# Patient Record
Sex: FEMALE | Race: BLACK OR AFRICAN AMERICAN | URBAN - METROPOLITAN AREA
[De-identification: names, ages, dates, MRNs, and addresses within clinical notes are randomized per-mention and may not be internally consistent; named-entity substitution may affect disease eponyms.]

---

## 2021-11-29 ENCOUNTER — APPOINTMENT (OUTPATIENT)
Dept: GENERAL RADIOLOGY | Age: 36
End: 2021-11-29

## 2021-11-29 ENCOUNTER — HOSPITAL ENCOUNTER (EMERGENCY)
Age: 36
Discharge: LEFT BEFORE DISCHARGE | End: 2021-11-29

## 2021-11-29 ENCOUNTER — HOSPITAL ENCOUNTER (EMERGENCY)
Age: 36
Discharge: LEFT BEFORE DISCHARGE | End: 2021-11-30

## 2021-11-29 VITALS
WEIGHT: 195 LBS | HEIGHT: 64 IN | SYSTOLIC BLOOD PRESSURE: 213 MMHG | BODY MASS INDEX: 33.29 KG/M2 | RESPIRATION RATE: 14 BRPM | HEART RATE: 105 BPM | DIASTOLIC BLOOD PRESSURE: 139 MMHG | OXYGEN SATURATION: 95 % | TEMPERATURE: 97.5 F

## 2021-11-29 VITALS
TEMPERATURE: 98.4 F | RESPIRATION RATE: 16 BRPM | HEART RATE: 112 BPM | DIASTOLIC BLOOD PRESSURE: 109 MMHG | SYSTOLIC BLOOD PRESSURE: 198 MMHG | OXYGEN SATURATION: 96 %

## 2021-11-29 DIAGNOSIS — Z53.21 ELOPED FROM EMERGENCY DEPARTMENT: Primary | ICD-10-CM

## 2021-11-29 PROCEDURE — 4500000002 HC ER NO CHARGE

## 2021-11-29 ASSESSMENT — PAIN DESCRIPTION - LOCATION: LOCATION: HEAD;EYE

## 2021-11-29 ASSESSMENT — PAIN DESCRIPTION - FREQUENCY: FREQUENCY: CONTINUOUS

## 2021-11-29 ASSESSMENT — PAIN DESCRIPTION - PAIN TYPE: TYPE: ACUTE PAIN

## 2021-11-29 ASSESSMENT — PAIN DESCRIPTION - DESCRIPTORS: DESCRIPTORS: ACHING;CONSTANT;PRESSURE

## 2021-11-29 ASSESSMENT — PAIN DESCRIPTION - ONSET: ONSET: GRADUAL

## 2021-11-29 ASSESSMENT — PAIN DESCRIPTION - PROGRESSION: CLINICAL_PROGRESSION: GRADUALLY WORSENING

## 2021-11-29 ASSESSMENT — PAIN SCALES - GENERAL: PAINLEVEL_OUTOF10: 6

## 2021-11-29 NOTE — ED NOTES
Assessment:     Catherine was seen today for follow-up and letter for school/work.    Diagnoses and all orders for this visit:    ILD (interstitial lung disease)    Hypothyroid  -     Cancel: Thyroid Stimulating Hormone (TSH)  -     Thyroid Stimulating Hormone (TSH)    SLE (systemic lupus erythematosus related syndrome)  -     INR  -     HM1(CBC and Differential)  -     Comprehensive Metabolic Panel  -     predniSONE (DELTASONE) 1 MG tablet; Take 5 tablets (5 mg total) by mouth daily.  -     HM1 (CBC with Diff)    Migraine headache with aura    Frozen shoulder syndrome    Healthcare maintenance  -     Factor 10 Assay, Chromogenic        Plan:     1. ILD (interstitial lung disease)  Cc Dr. Bearden pulmonary.  I keeps up her nebs and her oxygen.  Because of her interstitial lung disease and her O2 dependence I will excuse her from jury duty note given    2. Hypothyroid  Has been stable with hypothyroidism on last TSH we'll recheck today.  I note that she does have a 10 pound weight loss from her last visit in October.  - Thyroid Stimulating Hormone (TSH)    3. SLE (systemic lupus erythematosus related syndrome)  Has SLE and sees Dr. José.  Is slowly weaning down her prednisone and is now on 5 mg daily.  I will check labs today  - INR  - HM1(CBC and Differential)  - Comprehensive Metabolic Panel  - predniSONE (DELTASONE) 1 MG tablet; Take 5 tablets (5 mg total) by mouth daily.  Dispense: 540 tablet; Refill: 0  - HM1 (CBC with Diff)    4. Migraine headache with aura  Only one migraine since her last office visit.  She opts to keep the neck splint on in place for now    5. Frozen shoulder syndrome  Range of motion regarding the upper extremities and light weights off for frozen shoulder syndrome.      This is a 40 minute visit with greater than 50% of the time spent counseling regarding weight loss issues reviewed.  Vertebral compression fractures reviewed.  We'll consider orthopedic consult for back bracing.  Does do  FIRST PROVIDER CONTACT ASSESSMENT NOTE       Department of Emergency Medicine   11/29/21  6:47 PM EST    Chief Complaint: Hypertension (pressure behind the eyes, does not take meds for it ) and Blurred Vision    History of Present Illness:   Waldemar Swift is a 38 y.o. female who presents to the ED for blurred vision and pressure behind eyes. Patient also reports hypertension. She does not take any medication for this. She is denying chest pain  [place]. Focused Physical Exam:  VS:  BP (!) 198/109   Pulse 112   Temp 98.4 °F (36.9 °C)   Resp 16   LMP 11/22/2021   SpO2 96%      General: Alert and in no apparent distress   CVS: Regular rate for age, normal rhythm  Resp: Clear and equal bilaterally with good airflow, no respiratory distress    Medical History:  has no past medical history on file. Surgical History:  has no past surgical history on file. Social History:  reports that she has been smoking cigarettes. She has been smoking about 0.50 packs per day. She has never used smokeless tobacco. She reports that she does not use drugs. Family History: family history is not on file. *ALLERGIES*     Patient has no known allergies.      Initial Plan of Care:  Initiate Treatment-Testing, Proceed toTreatment Area When Bed Available for ED Attending/MLP to Continue Care    -----------------END OF FIRST PROVIDER CONTACT ASSESSMENT NOTE--------------  Electronically signed by Connie Donovan PA-C   DD: 11/29/21         Connie Donovan PA-C  11/29/21 1848 light weights at home and is trying to be more active at home just walking around and doing more in her house which is excellent.  Weight loss perfect.  Encouraged her home exercises for frozen shoulder and she works with her  on this.  At this point in time she is not showing signs of depression we talk about transfer of care to one of my partners after I retire      Subjective:      Duane is a 38 y.o. female presenting to my clinic for follow-up of SLE, compression fractures, steroid dependence, interstitial lung disease with oxygen dependence.    Patient has not been in the ER since July when she had pneumonia.  This is been an excellent year for her.  She goes to the chronic pain clinic and they managed her oxycodone.  She sees Dr. markie rain and he helps manage her lupus.  [facility] and he helps manage her interstitial lung disease and her oxygen needs.  Currently she doesn't have any new problems with her back but at her kyphosis is progressing especially upper back kyphosis.    She has frozen shoulder and has gone to physical therapy.  She now works with her  to try to stretch out her arms and her range of motion about her arms.  Upon testing I see she has increased range of motion today.    Patient had depression in the past but currently does not show signs of depression.    She is on Coumadin for management of hypercoagulable state.  She has some lupus anticoagulant and we manage with the factor X.      She doesn't have any problems with her stomach or her GI system today.  She hasn't taken any falls.    She needs to be excused from jury duty.  He'll be too much for her to manage with follow her oxygen and her needs and her disability and poor mobility.      Current Outpatient Prescriptions on File Prior to Visit   Medication Sig Dispense Refill     alum/mag hydrox-simethicone-diphenhydramine-lidocaine (MAGIC MOUTHWASH) suspension Swish and swallow 5  "mL 4 (four) times a day as needed. Swish and swallow 5mL 3-4 times daily as needed. Standard prep: Diphenhydramine+Sucralfate+Mylanta       BD INSULIN PEN NEEDLE UF MINI 31 gauge x 3/16\" Ndle        biotin 2,500 mcg cap Take 2,500 mcg by mouth every evening.        calcium, as carbonate, (TUMS) 200 mg calcium (500 mg) chewable tablet Chew 1 tablet as needed.        cholecalciferol, vitamin D3, 1,000 unit tablet Take 1,000 Units by mouth daily.       FERROUS SULFATE (SLOW FE ORAL) Take 65 mg by mouth daily.       furosemide (LASIX) 40 MG tablet Take 1 tablet (40 mg total) by mouth 2 (two) times a day at 9am and 6pm. 60 tablet 11     hydroxychloroquine (PLAQUENIL) 200 mg tablet Take 1 tablet (200 mg total) by mouth 2 (two) times a day. 180 tablet 0     ipratropium-albuterol (DUO-NEB) 0.5-2.5 mg/3 mL nebulizer Take 3 mL by nebulization 4 (four) times a day. 40 vial 0     levothyroxine (SYNTHROID) 125 MCG tablet Take 1 tablet (125 mcg total) by mouth daily. 30 tablet 11     levothyroxine (SYNTHROID, LEVOTHROID) 125 MCG tablet TAKE 1 TABLET(125 MCG) BY MOUTH DAILY 90 tablet 2     LORazepam (ATIVAN) 1 MG tablet Take 1 tablet (1 mg total) by mouth once daily. 30 tablet 1     nystatin (MYCOSTATIN) powder Apply to affected area 3 times daily 60 g 1     oxyCODONE (ROXICODONE) 30 MG immediate release tablet Take 1 tablet (30 mg total) by mouth every 4 (four) hours as needed for pain. 180 tablet 0     OXYCONTIN 40 mg 12 hr tablet Take 1 tablet (40 mg total) by mouth every morning. 30 tablet 0     warfarin (COUMADIN) 2 MG tablet Take 4 mg by mouth See Admin Instructions. 4mg (2 x 2mg tabs=4mg) on Mon, Wed, Fri, Sat       warfarin (COUMADIN) 2 MG tablet yuridia 2 tabs daily,. Adjust dose based on INR results as directed. 180 tablet 0     [DISCONTINUED] predniSONE (DELTASONE) 1 MG tablet Take 6 tablets (6 mg total) by mouth daily. 540 tablet 0     albuterol (PROVENTIL HFA;VENTOLIN HFA) 90 mcg/actuation inhaler Inhale 2 puffs every 6 " (six) hours as needed for wheezing. 1 Inhaler 6     albuterol (PROVENTIL) 2.5 mg /3 mL (0.083 %) nebulizer solution Take 3 mL (2.5 mg total) by nebulization 4 (four) times a day as needed for wheezing or shortness of breath. 75 mL 11     warfarin (COUMADIN) 2 MG tablet 5 mg See Admin Instructions. 5 mg Tues/Thursday/Sunday       [DISCONTINUED] predniSONE (DELTASONE) 10 MG tablet Take 1 tablet (10 mg total) by mouth 2 (two) times a day for 5 days. 10 tablet 0     No current facility-administered medications on file prior to visit.      Allergies   Allergen Reactions     Hydrocodone-Acetaminophen Swelling     Throat swelling (tolerates oxycodone)  - Acetaminophen causes vomiting per patient/RN, 12/2014 tolerated hydromorphone with hospitalization     Protonix [Pantoprazole] Hives     Per pt  Per pt     Codeine Nausea Only     abd pain       Fentanyl Nausea And Vomiting and Unknown       Tolerated drip 9/28/15- pt states just nausea at times      Morphine Hives, Nausea And Vomiting and Other (See Comments)     Pt states nausea     Morphine Hcl Itching and Nausea And Vomiting     Pt states she has tolerated in past- just nausea and vomiting at times     Past Medical History   Diagnosis Date     Antiphospholipid antibody with hypercoagulable state      Secondary to lupus     Cervical compression fracture 2009     closed, with spinal cord injury C1-C4 - prednisone induced     Chronic hypercapnic respiratory failure 12/22/2014     Chronic pain      on opiates     Congestive heart failure      Diastolic, Cor Pulmonale, EF 60% 2014     Depression      Gastroparesis      causing nausea/vomiting with recurrent admissions     GERD (gastroesophageal reflux disease)      Hypertension      Lupus (systemic lupus erythematosus)      complicated by joint and pulmonary involvement     Obesity      MERLIN (obstructive sleep apnea) 1/30/2015     On home bipap     Osteoporosis      Peripheral neuropathy      Recurrent Clostridium difficile  diarrhea      Seizures      once due to high BP, hypertensive encephalopathy     Past Surgical History   Procedure Laterality Date     Pr lap,cholecystectomy       Description: Cholecystectomy Laparoscopic;  Recorded: 02/15/2008;     Pr biopsy lung/mediastinum percutaneous needle       Description: Biopsy Lung Percutaneous;  Recorded: 09/15/2011;     Thoracoscopy Left 1/13/2015     Procedure: LEFT THORACOSCOPY CONVERTED TO THORACOTOMY;  Surgeon: Jose David Granados MD;  Location: [facility];  Service:      Thoracotomy Left 1/13/2015     Procedure: THORACOTOMY W/ WEDGE RESECTION;  Surgeon: Jose David Granados MD;  Location: [facility];  Service:      Picc and midline team line insertion  9/27/2015           Social History     Social History     Marital status:      Spouse name: N/A     Number of children: N/A     Years of education: N/A     Occupational History     Not on file.     Social History Main Topics     Smoking status: Former Smoker     Packs/day: 0.50     Years: 10.00     Types: Cigarettes     Quit date: 7/29/2009     Smokeless tobacco: Never Used     Alcohol use No     Drug use: Yes     Special: Oxycodone     Sexual activity: Yes     Partners: Male      Comment: Christina, in 2016     Other Topics Concern     Not on file     Social History Narrative    Lives with family. Lives in [place].     Family History   Problem Relation Age of Onset     Asthma Mother      Hypertension Mother      Stroke Mother      Clotting disorder Mother      Hypertension Father      Stroke Father      Stroke Maternal Aunt      Mental illness Sister      Stroke Sister      Clotting disorder Sister        ROS:  I have performed a 10 point ROS.  All pertinent positives and negatives are found in the HPI.  All others are negative.    Only one migraine in last 3 months.  10 pound weight loss noted.  Only one ER visit this last year and that was July for pneumonia    Objective:     Physical Exam:  Visit Vitals      "/86 (Patient Site: Left Arm, Patient Position: Sitting, Cuff Size: Adult Large)     Pulse (!) 104     Resp 20     Ht 4' 10\" (1.473 m)     Wt 212 lb 4.8 oz (96.3 kg)     SpO2 93%     BMI 44.37 kg/m2     General Appearance: Alert, cooperative, no distress, looks older than her stated age  Marked kyphosis are noted.  No obvious depression or anxiety one morning first annual wellness totally just fluid this uncinate hassle me about it I had the wellness exam and and accidentally deleted it became get back in himself out to just put in a regular examc I do vessel  Head: Normocephalic, without obvious abnormality, atraumatic  Nose: Nares normal, septum midline,mucosa normal, no drainage  Throat: Lips, mucosa, and tongue normal; teeth and gums normal  Neck: Supple, symmetrical, trachea midline, no adenopathy;  thyroid: not enlarged, symmetric, no tenderness/mass/nodules; no carotid bruit or JVD  Lungs: Clear to auscultation bilaterally, respirations unlabored/on 4 L of oxygen evidence of decreased breath sounds overall  Heart: Regular rate and rhythm, S1 and S2 normal, no murmur, rub, or gallop,     Abdomen: Soft, non-tender, bowel sounds active all four quadrants,  no masses, no organomegaly  Back: Tenderness at vertebral fracture sites marked kyphosis/scoliosis and evidence of l, tenderness at T 8 area and again at lumbar 3-4 area.   Marked kyphosis at the upper back     Extremities: Extremities normal, atraumac, no cyanosis or edema/  Decreased range of motion with both shoulders are most pronounced on the right.  Despite the evidence of frozen's shoulder syndrome bilaterally she has increased range of motion from her last visit  Skin: Skin color, texture, turgor normal, no rashes or lesions/malar rash on face   Lymph nodes: Cervical, supraclavicular, and axillary nodes normal  Neurologic: Intact, no focal deficits   Mental status:  Appropriate, Affect normal/no signs of acute anxiety or depression stable       "

## 2023-05-11 ENCOUNTER — HOSPITAL ENCOUNTER (EMERGENCY)
Age: 38
Discharge: HOME | End: 2023-05-11
Payer: MEDICAID

## 2023-05-11 ENCOUNTER — APPOINTMENT (OUTPATIENT)
Dept: GENERAL RADIOLOGY | Age: 38
End: 2023-05-11
Payer: MEDICAID

## 2023-05-11 VITALS
HEIGHT: 64 IN | DIASTOLIC BLOOD PRESSURE: 84 MMHG | SYSTOLIC BLOOD PRESSURE: 138 MMHG | OXYGEN SATURATION: 98 % | HEART RATE: 99 BPM | RESPIRATION RATE: 16 BRPM | BODY MASS INDEX: 35 KG/M2 | WEIGHT: 205 LBS | TEMPERATURE: 98.8 F

## 2023-05-11 DIAGNOSIS — S93.401A SPRAIN OF RIGHT ANKLE, UNSPECIFIED LIGAMENT, INITIAL ENCOUNTER: Primary | ICD-10-CM

## 2023-05-11 PROCEDURE — 73610 X-RAY EXAM OF ANKLE: CPT

## 2023-05-11 PROCEDURE — 99283 EMERGENCY DEPT VISIT LOW MDM: CPT

## 2023-05-11 RX ORDER — IBUPROFEN 600 MG/1
600 TABLET ORAL 3 TIMES DAILY PRN
Qty: 30 TABLET | Refills: 0 | Status: SHIPPED | OUTPATIENT
Start: 2023-05-11

## 2023-05-11 ASSESSMENT — PAIN - FUNCTIONAL ASSESSMENT: PAIN_FUNCTIONAL_ASSESSMENT: 0-10

## 2023-05-11 ASSESSMENT — PAIN DESCRIPTION - ORIENTATION: ORIENTATION: RIGHT

## 2023-05-11 ASSESSMENT — PAIN DESCRIPTION - LOCATION: LOCATION: ANKLE

## 2023-05-11 ASSESSMENT — PAIN DESCRIPTION - DESCRIPTORS: DESCRIPTORS: THROBBING

## 2023-05-11 ASSESSMENT — PAIN SCALES - GENERAL: PAINLEVEL_OUTOF10: 3

## 2023-05-11 NOTE — ED PROVIDER NOTES
possible for a visit in 3 days  For follow up      MEDICATIONS:   DISCHARGE MEDICATIONS:  New Prescriptions    IBUPROFEN (ADVIL;MOTRIN) 600 MG TABLET    Take 1 tablet by mouth 3 times daily as needed for Pain       DISCONTINUED MEDICATIONS:  Discontinued Medications    No medications on file       Record Review:  Records Reviewed : None       Disposition Considerations: This patient's ED course included: a personal history and physicial examination and re-evaluation prior to disposition  This patient has remained hemodynamically stable during their ED course. I emphasized the importance of follow-up with the physician I referred them to in the timeframe recommended. I discussed with the patient emergent symptoms and the need to immediately return to the ER. Written information was included in their discharge instructions. Additional verbal discharge instructions were also given and discussed with the patient to supplement those generated by the EMR. We also discussed medications that were prescribed  (if any) including common side effects and interactions. The patient was advised to abstain from driving, operating heavy machinery or making significant decisions while taking medications such as opiates and muscle relaxers that may impair this. All questions were addressed. They understand return precautions and discharge instructions. The patient  expressed understanding. Vitals were stable and they were in no distress at discharge. Assessment      1. Sprain of right ankle, unspecified ligament, initial encounter      Plan   Discharged home. Patient condition is good    New Medications     New Prescriptions    IBUPROFEN (ADVIL;MOTRIN) 600 MG TABLET    Take 1 tablet by mouth 3 times daily as needed for Pain     Electronically signed by CHELSEA Bravo CNP   DD: 5/11/23  **This report was transcribed using voice recognition software.  Every effort was made to ensure accuracy; however,

## 2025-06-03 ENCOUNTER — HOSPITAL ENCOUNTER (EMERGENCY)
Age: 40
Discharge: HOME | End: 2025-06-03
Payer: MEDICAID

## 2025-06-03 VITALS
DIASTOLIC BLOOD PRESSURE: 117 MMHG | HEART RATE: 99 BPM | HEIGHT: 64 IN | RESPIRATION RATE: 18 BRPM | WEIGHT: 200 LBS | TEMPERATURE: 99.8 F | BODY MASS INDEX: 34.15 KG/M2 | OXYGEN SATURATION: 98 % | SYSTOLIC BLOOD PRESSURE: 176 MMHG

## 2025-06-03 DIAGNOSIS — I10 HYPERTENSION, UNCONTROLLED: ICD-10-CM

## 2025-06-03 DIAGNOSIS — K04.7 DENTAL ABSCESS: Primary | ICD-10-CM

## 2025-06-03 PROCEDURE — 99283 EMERGENCY DEPT VISIT LOW MDM: CPT

## 2025-06-03 PROCEDURE — 6370000000 HC RX 637 (ALT 250 FOR IP): Performed by: PHYSICIAN ASSISTANT

## 2025-06-03 RX ORDER — IBUPROFEN 600 MG/1
600 TABLET, FILM COATED ORAL 3 TIMES DAILY PRN
Qty: 30 TABLET | Refills: 0 | Status: SHIPPED | OUTPATIENT
Start: 2025-06-03

## 2025-06-03 RX ORDER — OXYCODONE AND ACETAMINOPHEN 5; 325 MG/1; MG/1
1 TABLET ORAL ONCE
Refills: 0 | Status: COMPLETED | OUTPATIENT
Start: 2025-06-03 | End: 2025-06-03

## 2025-06-03 RX ORDER — HYDROCODONE BITARTRATE AND ACETAMINOPHEN 5; 325 MG/1; MG/1
1 TABLET ORAL EVERY 6 HOURS PRN
Qty: 10 TABLET | Refills: 0 | Status: SHIPPED | OUTPATIENT
Start: 2025-06-03 | End: 2025-06-06

## 2025-06-03 RX ADMIN — OXYCODONE AND ACETAMINOPHEN 1 TABLET: 5; 325 TABLET ORAL at 13:18

## 2025-06-03 RX ADMIN — AMOXICILLIN AND CLAVULANATE POTASSIUM 1 TABLET: 875; 125 TABLET, FILM COATED ORAL at 13:18

## 2025-06-03 ASSESSMENT — PAIN DESCRIPTION - ORIENTATION
ORIENTATION: RIGHT
ORIENTATION: RIGHT

## 2025-06-03 ASSESSMENT — PAIN DESCRIPTION - LOCATION
LOCATION: MOUTH;TEETH
LOCATION: FACE

## 2025-06-03 ASSESSMENT — PAIN - FUNCTIONAL ASSESSMENT: PAIN_FUNCTIONAL_ASSESSMENT: 0-10

## 2025-06-03 ASSESSMENT — PAIN DESCRIPTION - FREQUENCY: FREQUENCY: CONTINUOUS

## 2025-06-03 ASSESSMENT — PAIN DESCRIPTION - DESCRIPTORS
DESCRIPTORS: SORE;THROBBING
DESCRIPTORS: ACHING;DISCOMFORT;SORE

## 2025-06-03 ASSESSMENT — LIFESTYLE VARIABLES
HOW OFTEN DO YOU HAVE A DRINK CONTAINING ALCOHOL: NEVER
HOW MANY STANDARD DRINKS CONTAINING ALCOHOL DO YOU HAVE ON A TYPICAL DAY: PATIENT DOES NOT DRINK

## 2025-06-03 ASSESSMENT — PAIN SCALES - GENERAL
PAINLEVEL_OUTOF10: 10
PAINLEVEL_OUTOF10: 8

## 2025-06-03 ASSESSMENT — PAIN DESCRIPTION - ONSET: ONSET: ON-GOING

## 2025-06-03 ASSESSMENT — PAIN DESCRIPTION - PAIN TYPE: TYPE: ACUTE PAIN

## 2025-06-03 NOTE — ED PROVIDER NOTES
Independent HARRIET Visit.         [facility] EMERGENCY DEPARTMENT  ED  Encounter Note  Admit Date/RoomTime: 6/3/2025 12:44 PM  ED Room:   NAME: Sharla Emerson  : 1985  MRN: [id number]  PCP: No primary care provider on file.    CHIEF COMPLAINT     Facial Swelling (Right side of face/jaw swelling, dental pain)    HISTORY OF PRESENT ILLNESS        Sharla Emerson is a 39 y.o. female who presents to the ED by private vehicle for right facial swelling, beginning last night. The complaint has been gradually worsening and are mild in severity.  The patient reports noticing right-sided facial pain and swelling of the right cheek and jaw that began last night.  The right side of her mouth and cheek are painful and she states it radiates up to her right ear and is giving her a right-sided headache. She states that she has a chipped tooth on the right upper side of her mouth and believes that it may be infected.  States she does have a dentist but does not recall the last time she was seen.  States that she is still able to eat and drink. Denies fever, chills, nausea, vomiting.     REVIEW OF SYSTEMS     Pertinent positives and negatives are stated within HPI, all other systems reviewed and are negative.    Past Medical History:  has no past medical history on file.  Surgical History:  has no past surgical history on file.  Social History:  reports that she has been smoking cigarettes. She has never used smokeless tobacco. She reports that she does not use drugs.  Family History: family history is not on file.   Allergies: Patient has no known allergies.  CURRENT MEDICATIONS       Discharge Medication List as of 6/3/2025  1:26 PM          SCREENINGS     [place] Coma Scale  Eye Opening: Spontaneous  Best Verbal Response: Oriented  Best Motor Response: Obeys commands  [place] Coma Scale Score: 15         CIWA Assessment  BP: (!) 176/117  Pulse: 99       PHYSICAL EXAM   Oxygen Saturation

## 2025-06-04 ENCOUNTER — HOSPITAL ENCOUNTER (EMERGENCY)
Age: 40
Discharge: HOME | End: 2025-06-04
Attending: EMERGENCY MEDICINE
Payer: MEDICAID

## 2025-06-04 ENCOUNTER — APPOINTMENT (OUTPATIENT)
Dept: CT IMAGING | Age: 40
End: 2025-06-04
Attending: EMERGENCY MEDICINE
Payer: MEDICAID

## 2025-06-04 VITALS
RESPIRATION RATE: 16 BRPM | TEMPERATURE: 98.6 F | SYSTOLIC BLOOD PRESSURE: 165 MMHG | HEART RATE: 98 BPM | OXYGEN SATURATION: 100 % | DIASTOLIC BLOOD PRESSURE: 96 MMHG

## 2025-06-04 DIAGNOSIS — K04.7 DENTAL ABSCESS: Primary | ICD-10-CM

## 2025-06-04 DIAGNOSIS — R03.0 ELEVATED BLOOD PRESSURE READING: ICD-10-CM

## 2025-06-04 DIAGNOSIS — R22.0 FACIAL SWELLING: ICD-10-CM

## 2025-06-04 LAB
ALBUMIN SERPL-MCNC: 4.3 G/DL (ref 3.5–5.2)
ALP SERPL-CCNC: 114 U/L (ref 35–104)
ALT SERPL-CCNC: 25 U/L (ref 0–35)
ANION GAP SERPL CALCULATED.3IONS-SCNC: 9 MMOL/L (ref 7–16)
AST SERPL-CCNC: 26 U/L (ref 0–35)
BASOPHILS # BLD: 0.06 K/UL (ref 0–0.2)
BASOPHILS NFR BLD: 1 % (ref 0–2)
BILIRUB SERPL-MCNC: 0.4 MG/DL (ref 0–1.2)
BUN SERPL-MCNC: 7 MG/DL (ref 6–20)
CALCIUM SERPL-MCNC: 9.6 MG/DL (ref 8.6–10)
CHLORIDE SERPL-SCNC: 107 MMOL/L (ref 98–107)
CO2 SERPL-SCNC: 24 MMOL/L (ref 22–29)
CREAT SERPL-MCNC: 0.6 MG/DL (ref 0.5–1)
EOSINOPHIL # BLD: 0.18 K/UL (ref 0.05–0.5)
EOSINOPHILS RELATIVE PERCENT: 2 % (ref 0–6)
ERYTHROCYTE [DISTWIDTH] IN BLOOD BY AUTOMATED COUNT: 14.2 % (ref 11.5–15)
GFR, ESTIMATED: >90 ML/MIN/1.73M2
GLUCOSE SERPL-MCNC: 119 MG/DL (ref 74–99)
HCT VFR BLD AUTO: 40.1 % (ref 34–48)
HGB BLD-MCNC: 13.3 G/DL (ref 11.5–15.5)
IMM GRANULOCYTES # BLD AUTO: 0.04 K/UL (ref 0–0.58)
IMM GRANULOCYTES NFR BLD: 0 % (ref 0–5)
LACTATE BLDV-SCNC: 0.8 MMOL/L (ref 0.5–2.2)
LYMPHOCYTES NFR BLD: 1.65 K/UL (ref 1.5–4)
LYMPHOCYTES RELATIVE PERCENT: 14 % (ref 20–42)
MCH RBC QN AUTO: 28.5 PG (ref 26–35)
MCHC RBC AUTO-ENTMCNC: 33.2 G/DL (ref 32–34.5)
MCV RBC AUTO: 85.9 FL (ref 80–99.9)
MONOCYTES NFR BLD: 0.52 K/UL (ref 0.1–0.95)
MONOCYTES NFR BLD: 4 % (ref 2–12)
NEUTROPHILS NFR BLD: 80 % (ref 43–80)
NEUTS SEG NFR BLD: 9.6 K/UL (ref 1.8–7.3)
PLATELET # BLD AUTO: 263 K/UL (ref 130–450)
PMV BLD AUTO: 9.9 FL (ref 7–12)
POTASSIUM SERPL-SCNC: 3.6 MMOL/L (ref 3.5–5.1)
PROT SERPL-MCNC: 7.3 G/DL (ref 6.4–8.3)
RBC # BLD AUTO: 4.67 M/UL (ref 3.5–5.5)
SODIUM SERPL-SCNC: 140 MMOL/L (ref 136–145)
T4 FREE SERPL-MCNC: 1.2 NG/DL (ref 0.9–1.7)
TSH SERPL DL<=0.05 MIU/L-ACNC: 2.02 UIU/ML (ref 0.27–4.2)
WBC OTHER # BLD: 12.1 K/UL (ref 4.5–11.5)

## 2025-06-04 PROCEDURE — 85025 COMPLETE CBC W/AUTO DIFF WBC: CPT

## 2025-06-04 PROCEDURE — 96375 TX/PRO/DX INJ NEW DRUG ADDON: CPT

## 2025-06-04 PROCEDURE — 83605 ASSAY OF LACTIC ACID: CPT

## 2025-06-04 PROCEDURE — 70491 CT SOFT TISSUE NECK W/DYE: CPT

## 2025-06-04 PROCEDURE — 99285 EMERGENCY DEPT VISIT HI MDM: CPT

## 2025-06-04 PROCEDURE — 6360000002 HC RX W HCPCS: Performed by: EMERGENCY MEDICINE

## 2025-06-04 PROCEDURE — 80053 COMPREHEN METABOLIC PANEL: CPT

## 2025-06-04 PROCEDURE — 6360000004 HC RX CONTRAST MEDICATION: Performed by: RADIOLOGY

## 2025-06-04 PROCEDURE — 84439 ASSAY OF FREE THYROXINE: CPT

## 2025-06-04 PROCEDURE — 84443 ASSAY THYROID STIM HORMONE: CPT

## 2025-06-04 PROCEDURE — 96365 THER/PROPH/DIAG IV INF INIT: CPT

## 2025-06-04 RX ORDER — LABETALOL HYDROCHLORIDE 5 MG/ML
5 INJECTION, SOLUTION INTRAVENOUS
Status: COMPLETED | OUTPATIENT
Start: 2025-06-04 | End: 2025-06-04

## 2025-06-04 RX ORDER — KETOROLAC TROMETHAMINE 30 MG/ML
30 INJECTION, SOLUTION INTRAMUSCULAR; INTRAVENOUS ONCE
Status: COMPLETED | OUTPATIENT
Start: 2025-06-04 | End: 2025-06-04

## 2025-06-04 RX ORDER — IOPAMIDOL 755 MG/ML
75 INJECTION, SOLUTION INTRAVASCULAR
Status: COMPLETED | OUTPATIENT
Start: 2025-06-04 | End: 2025-06-04

## 2025-06-04 RX ORDER — CLINDAMYCIN PHOSPHATE 600 MG/50ML
600 INJECTION, SOLUTION INTRAVENOUS ONCE
Status: COMPLETED | OUTPATIENT
Start: 2025-06-04 | End: 2025-06-04

## 2025-06-04 RX ORDER — DEXAMETHASONE SODIUM PHOSPHATE 10 MG/ML
10 INJECTION, SOLUTION INTRAMUSCULAR; INTRAVENOUS ONCE
Status: COMPLETED | OUTPATIENT
Start: 2025-06-04 | End: 2025-06-04

## 2025-06-04 RX ADMIN — IOPAMIDOL 75 ML: 755 INJECTION, SOLUTION INTRAVENOUS at 10:10

## 2025-06-04 RX ADMIN — KETOROLAC TROMETHAMINE 30 MG: 30 INJECTION, SOLUTION INTRAMUSCULAR at 08:43

## 2025-06-04 RX ADMIN — DEXAMETHASONE SODIUM PHOSPHATE 10 MG: 10 INJECTION, SOLUTION INTRAMUSCULAR; INTRAVENOUS at 08:43

## 2025-06-04 RX ADMIN — LABETALOL HYDROCHLORIDE 5 MG: 5 INJECTION, SOLUTION INTRAVENOUS at 08:41

## 2025-06-04 RX ADMIN — CLINDAMYCIN PHOSPHATE 600 MG: 600 INJECTION, SOLUTION INTRAVENOUS at 08:50

## 2025-06-04 ASSESSMENT — ENCOUNTER SYMPTOMS
NAUSEA: 0
DIARRHEA: 0
ABDOMINAL DISTENTION: 0
SHORTNESS OF BREATH: 0
FACIAL SWELLING: 1
SINUS PAIN: 0
SORE THROAT: 0
TROUBLE SWALLOWING: 0
SINUS PRESSURE: 0
VOMITING: 0
ABDOMINAL PAIN: 0
COUGH: 0

## 2025-06-04 NOTE — ED PROVIDER NOTES
[facility] EMERGENCY DEPARTMENT  EMERGENCY DEPARTMENT ENCOUNTER        Pt Name: Sharla Emerson  MRN: [id number]  Birthdate 1985  Date of evaluation: 6/4/2025  Provider: Jaimee Cruz MD  PCP: No primary care provider on file.  Note Started: 7:57 AM EDT 6/4/25    CHIEF COMPLAINT       Chief Complaint   Patient presents with    Abscess     Right lower dental abscess that has increased in swelling since yesterday. Now going into the neck. Denies SOB. Pt was started on ATB's.        HISTORY OF PRESENT ILLNESS: 1 or more Elements   History From: PATIENT     Limitations to history : None    Sharla Emerson is a 39 y.o. female presenting with dental pain and swelling x2 days. Was seen in the [place] ED 6/3 given augmentin (took 2 doses), ibuprofen and norco, however she woke this morning with increased swelling now spreading down her neck, associated with pressure sensation.   Has not seen the dentist, does not have a regular dentist. Has not had any intervention on abscess, has been using warm compress, mouthwash and regular tooth brushing at home.    Still able to eat and drink.  Continues to smoke cigarettes.      Nursing Notes were all reviewed and agreed with or any disagreements were addressed in the HPI.    REVIEW OF SYSTEMS :      Review of Systems   Constitutional:  Negative for chills, diaphoresis and fever.   HENT:  Positive for dental problem and facial swelling. Negative for congestion, drooling, sinus pressure, sinus pain, sore throat and trouble swallowing.    Respiratory:  Negative for cough and shortness of breath.    Cardiovascular:  Negative for chest pain, palpitations and leg swelling.   Gastrointestinal:  Negative for abdominal distention, abdominal pain, diarrhea, nausea and vomiting.   Neurological:  Negative for dizziness and headaches.     SURGICAL HISTORY   No past surgical history on file.    CURRENTMEDICATIONS       Previous Medications

## 2025-06-04 NOTE — PROGRESS NOTES
Name: Sharla Emerson  : 1985  MRN: [id number]    Date: 2025    Benefits of immediately proceeding with Radiology exam outweigh the risks and therefore the following is being waived:      [x] Pregnancy test    [] Protocol for Iodine allergy    [] MRI questionnaire    [x] BUN/Creatinine        Karl Babcock, DO

## 2025-06-30 ENCOUNTER — APPOINTMENT (OUTPATIENT)
Dept: CT IMAGING | Age: 40
End: 2025-06-30
Payer: COMMERCIAL

## 2025-06-30 ENCOUNTER — HOSPITAL ENCOUNTER (EMERGENCY)
Age: 40
Discharge: HOME | End: 2025-06-30
Payer: COMMERCIAL

## 2025-06-30 VITALS
DIASTOLIC BLOOD PRESSURE: 102 MMHG | OXYGEN SATURATION: 99 % | RESPIRATION RATE: 18 BRPM | BODY MASS INDEX: 34.33 KG/M2 | TEMPERATURE: 97.8 F | WEIGHT: 200 LBS | SYSTOLIC BLOOD PRESSURE: 145 MMHG | HEART RATE: 96 BPM

## 2025-06-30 DIAGNOSIS — R07.89 CHEST WALL PAIN: Primary | ICD-10-CM

## 2025-06-30 DIAGNOSIS — S09.90XA CLOSED HEAD INJURY, INITIAL ENCOUNTER: ICD-10-CM
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
## 2025-06-30 LAB
HCG, URINE, POC: NEGATIVE
Lab: NORMAL
NEGATIVE QC PASS/FAIL: NORMAL
POSITIVE QC PASS/FAIL: NORMAL

## 2025-06-30 PROCEDURE — 99284 EMERGENCY DEPT VISIT MOD MDM: CPT

## 2025-06-30 PROCEDURE — 6370000000 HC RX 637 (ALT 250 FOR IP)

## 2025-06-30 PROCEDURE — 71250 CT THORAX DX C-: CPT

## 2025-06-30 PROCEDURE — 70450 CT HEAD/BRAIN W/O DYE: CPT

## 2025-06-30 PROCEDURE — 70486 CT MAXILLOFACIAL W/O DYE: CPT

## 2025-06-30 RX ORDER — METHOCARBAMOL 750 MG/1
750 TABLET, FILM COATED ORAL 4 TIMES DAILY
Qty: 20 TABLET | Refills: 0 | Status: SHIPPED | OUTPATIENT
Start: 2025-06-30 | End: 2025-07-05

## 2025-06-30 RX ORDER — METHOCARBAMOL 500 MG/1
1000 TABLET, FILM COATED ORAL ONCE
Status: COMPLETED | OUTPATIENT
Start: 2025-06-30 | End: 2025-06-30

## 2025-06-30 RX ORDER — LIDOCAINE 50 MG/G
1 PATCH TOPICAL DAILY
Qty: 10 PATCH | Refills: 0 | Status: SHIPPED | OUTPATIENT
Start: 2025-06-30 | End: 2025-07-10

## 2025-06-30 RX ORDER — IBUPROFEN 600 MG/1
600 TABLET, FILM COATED ORAL 3 TIMES DAILY PRN
Qty: 30 TABLET | Refills: 0 | Status: SHIPPED | OUTPATIENT
Start: 2025-06-30

## 2025-06-30 RX ADMIN — METHOCARBAMOL 1000 MG: 500 TABLET ORAL at 11:21

## 2025-06-30 ASSESSMENT — LIFESTYLE VARIABLES
HOW MANY STANDARD DRINKS CONTAINING ALCOHOL DO YOU HAVE ON A TYPICAL DAY: PATIENT DOES NOT DRINK
HOW OFTEN DO YOU HAVE A DRINK CONTAINING ALCOHOL: MONTHLY OR LESS

## 2025-06-30 ASSESSMENT — PAIN DESCRIPTION - PAIN TYPE: TYPE: ACUTE PAIN

## 2025-06-30 ASSESSMENT — PAIN DESCRIPTION - FREQUENCY: FREQUENCY: CONTINUOUS

## 2025-06-30 ASSESSMENT — PAIN SCALES - GENERAL
PAINLEVEL_OUTOF10: 10
PAINLEVEL_OUTOF10: 6

## 2025-06-30 ASSESSMENT — PAIN - FUNCTIONAL ASSESSMENT: PAIN_FUNCTIONAL_ASSESSMENT: 0-10

## 2025-06-30 ASSESSMENT — PAIN DESCRIPTION - ONSET: ONSET: ON-GOING

## 2025-06-30 ASSESSMENT — PAIN DESCRIPTION - LOCATION: LOCATION: CHEST;EYE

## 2025-06-30 ASSESSMENT — PAIN DESCRIPTION - ORIENTATION: ORIENTATION: RIGHT;LEFT

## 2025-06-30 ASSESSMENT — PAIN DESCRIPTION - DESCRIPTORS: DESCRIPTORS: SORE;TENDER
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
## 2025-06-30 NOTE — DISCHARGE INSTRUCTIONS
- Take medications were sent to your pharmacy as prescribed  - Please be cautious while taking methocarbamol.  This is a muscle relaxer.  You should not drive after taking this medication.  Reserve for nighttime use if needed  - Apply lidocaine patch directly over right side of chest for pain is  -If you develop any new or concerning symptoms, return to the emergency department for reevaluation.  -Ice left side of head and right side of chest 15 to 20 minutes at a time, multiple times a day.  CT HEAD WO CONTRAST   Final Result   No acute intracranial abnormality.         CT CHEST WO CONTRAST   Final Result   Atraumatic appearance the chest.      Enlarged right lobe of thyroid gland with ill-defined 1.5 cm low-attenuation   area.  Correlation with thyroid ultrasound on a nonemergent basis.         CT FACIAL BONES WO CONTRAST   Final Result   No acute facial bone trauma.             Is This A New Presentation, Or A Follow-Up?: Rash

## 2025-07-12 LAB
EKG ATRIAL RATE: 104 BPM
EKG P AXIS: 76 DEGREES
EKG P-R INTERVAL: 146 MS
EKG Q-T INTERVAL: 348 MS
EKG QRS DURATION: 80 MS
EKG QTC CALCULATION (BAZETT): 457 MS
EKG R AXIS: 71 DEGREES
EKG T AXIS: 14 DEGREES
EKG VENTRICULAR RATE: 104 BPM